# Patient Record
(demographics unavailable — no encounter records)

---

## 2025-06-13 NOTE — HISTORY OF PRESENT ILLNESS
[FreeTextEntry1] : 66 year old patient prsents to the office for a upper back growth, approximately 3 months ago Patient has been seen by Dermatology.  Referred for excision No previous treatments. No itching, bleeding, or drainage. No Imaging/Biopsy. Slowly growing, no change in color. No Infection or inflammation. No pain or discomfort. No personal hx of masses, moles, lesions.  No family hx of skin cancer. PMH includes hypothyroidism elevated BMI, hypertension and idiopathic neuropathy PSH includes gallbladder removal and 2 shoulder surgeries No history of keloids or poor scarring